# Patient Record
Sex: FEMALE | Race: WHITE | Employment: FULL TIME | URBAN - NONMETROPOLITAN AREA
[De-identification: names, ages, dates, MRNs, and addresses within clinical notes are randomized per-mention and may not be internally consistent; named-entity substitution may affect disease eponyms.]

---

## 2021-03-26 ENCOUNTER — TELEPHONE (OUTPATIENT)
Dept: SURGERY | Age: 61
End: 2021-03-26

## 2021-03-26 DIAGNOSIS — Z12.11 SCREEN FOR COLON CANCER: ICD-10-CM

## 2021-03-26 DIAGNOSIS — Z01.818 PRE-OP TESTING: Primary | ICD-10-CM

## 2021-03-26 NOTE — TELEPHONE ENCOUNTER
[facility] Colonoscopy Worksheet    Patient Name: Tisha Mcintosh  : 1960  Primary Care Physician: No primary care provider on file. Today's Date: 3/26/2021    Surgery Location:   [][place] [] [place] [x] [facility] [place] [] [facility]    Why has a Colonoscopy been recommended for you? Screening    To properly code your procedure we must ask the following questions. PLEASE CHECK ALL THAT APPLY. Are you currently having any of the following symptoms? NO    [] Rectal Bleeding (K62.5) [] Bloody Stool (K92.1) [] Dark Tarry Stool (K92.1)  [] Fecal Occult Positive Blood (confirmed by blood test R19.5)  [] Anemia (low hemoglobin D64.9) [] Abdominal Pain (R10.84) [] Diarrhea (R19.7)    **If you have any of these symptoms your colonoscopy is diagnostic and must be coded as such. It will not be coded as a screening colonoscopy. If you have no symptoms but have a personal history of polyps or a family history of colon cancer you fall in the high risk-screening category and we need to know more information. If you have had a polyp or polyps removed at your last colonoscopy then the first scope after that is considered diagnostic. Also if you have irritable bowel syndrome Chron's disease, diverticulitis or colon cancer then the scope would be a diagnostic colonoscopy. Have you ever had a colonoscopy? [] Yes  [x] No    If so, where and when was that done? Was anything found during the last scope? Was it removed? Do you have a family history of colon cancer? NO      Have you personally been diagnosed with colon cancer? NO    Any tobacco use? [] Yes    [x] No    Any alcohol use? [x] Yes    [] No  If yes, how often? Occassionally    In the last six (6) months have you experienced any of the following symptoms?  NO   [] Blood from the rectum or stool  [] Abdominal Pain   [] Diarrhea  [] Itching of rectum   [] Vomiting  [] Constipation   [] Black stools  [] Bloating

## 2021-03-27 RX ORDER — POLYETHYLENE GLYCOL 3350 17 G/17G
POWDER, FOR SOLUTION ORAL
Qty: 1 BOTTLE | Refills: 0 | Status: SHIPPED | OUTPATIENT
Start: 2021-03-27 | End: 2022-07-13

## 2021-04-22 LAB
SARS-COV-2, RAPID: NEGATIVE
SARS-COV-2, RAPID: NEGATIVE

## 2022-07-13 ENCOUNTER — OFFICE VISIT (OUTPATIENT)
Dept: SURGERY | Age: 62
End: 2022-07-13
Payer: COMMERCIAL

## 2022-07-13 VITALS
BODY MASS INDEX: 24.59 KG/M2 | RESPIRATION RATE: 16 BRPM | WEIGHT: 144 LBS | TEMPERATURE: 97.5 F | OXYGEN SATURATION: 98 % | SYSTOLIC BLOOD PRESSURE: 127 MMHG | HEART RATE: 63 BPM | DIASTOLIC BLOOD PRESSURE: 61 MMHG | HEIGHT: 64 IN

## 2022-07-13 DIAGNOSIS — K80.20 SYMPTOMATIC CHOLELITHIASIS: Primary | ICD-10-CM

## 2022-07-13 LAB
ANION GAP SERPL CALCULATED.3IONS-SCNC: 3.4 MMOL/L
BASOPHILS %: 1.27 (ref 0–3)
BASOPHILS ABSOLUTE: 0.1 (ref 0–0.3)
BUN BLDV-MCNC: 9 MG/DL (ref 7–17)
CALCIUM SERPL-MCNC: 9.2 MG/DL (ref 8.4–10.2)
CHLORIDE BLD-SCNC: 105 MMOL/L (ref 98–120)
CO2: 31 MMOL/L (ref 22–31)
CREAT SERPL-MCNC: 0.9 MG/DL (ref 0.5–1)
EOSINOPHILS %: 3.43 (ref 0–10)
EOSINOPHILS ABSOLUTE: 0.27 (ref 0–1.1)
GFR CALCULATED: > 60
GLUCOSE: 110 MG/DL (ref 65–105)
HCT VFR BLD CALC: 44.6 % (ref 37–47)
HEMOGLOBIN: 13.7 (ref 12–16)
LYMPHOCYTE %: 28.29 (ref 20–51.1)
LYMPHOCYTES ABSOLUTE: 2.23 (ref 1–5.5)
MCH RBC QN AUTO: 30.8 PG (ref 28.5–32.5)
MCHC RBC AUTO-ENTMCNC: 30.8 G/DL (ref 32–37)
MCV RBC AUTO: 100.1 FL (ref 80–94)
MONOCYTES %: 7.65 (ref 1.7–9.3)
MONOCYTES ABSOLUTE: 0.6 (ref 0.1–1)
NEUTROPHILS %: 59.36 (ref 42.2–75.2)
NEUTROPHILS ABSOLUTE: 4.68 (ref 2–8.1)
PDW BLD-RTO: 11.9 % (ref 8.5–15.5)
PLATELET # BLD: 246.2 THOU/MM3 (ref 130–400)
POTASSIUM SERPL-SCNC: 4 MMOL/L (ref 3.6–5)
RBC: 4.46 M/UL (ref 4.2–5.4)
SODIUM BLD-SCNC: 139 MMOL/L (ref 135–145)
WBC: 7.9 THOU/ML3 (ref 4.8–10.8)

## 2022-07-13 PROCEDURE — 99204 OFFICE O/P NEW MOD 45 MIN: CPT | Performed by: SURGERY

## 2022-07-13 NOTE — ASSESSMENT & PLAN NOTE
After evaluation and discussion of her recent symptoms I certainly think that it is consistent with symptomatic cholelithiasis. She is not having any ongoing significant symptoms that makes me think she has any acute inflammation or obstruction but certainly I think that she is having intermittent obstructions which is causing her symptoms. I will discuss to labs and imaging findings and the diagnosis of symptomatic cholelithiasis. We also discussed management options including surgical options. After discussion the patient would like to proceed with surgery. We will begin planning for laparoscopic cholecystectomy. Risks of the procedure including bleeding, infection, scarring, pain, damage surrounding structures including bile ducts, need for additional surgery, retained stone or bile leak, conversion to open and anesthesia risk are discussed and consent is obtained. Patient is recently had preoperative testing to include labs, EKG. No further testing should be necessary.

## 2022-07-13 NOTE — PROGRESS NOTES
Lindsay Rosa is a 63 y.o. female who presents today for further evaluation of ongoing episodes of right upper quadrant abdominal, flank and back pain. The patient is reporting about 2 weeks ago her initial episode occurred and she was awoken from sleep with fairly significant pain in the right side just below the costal margin. Her first episode she states was not bad enough to require medical attention and lasted for couple hours before resolving. However over the next several days she has had at least 2 additional episodes and these additional 2 episodes of certainly been more severe from her report. Again she was awoken from sleep by both of these episodes and she does report that prior to these really intense episode she had eaten at least on one occasion a fairly fatty meal.  Because of ongoing pain she initially went to the ER where she had a work-up that showed mild elevation of her white count and imaging did confirm gallstones with no signs of acute cholecystitis. During this time she denies any nausea or emesis. Has not had any changes in bowel movements. History reviewed. No pertinent past medical history. Past Surgical History:   Procedure Laterality Date    COLONOSCOPY  04/23/2021    Dr Dontrell Paredes, Diverticulosis, Repeat 10 years       No current outpatient medications on file. No current facility-administered medications for this visit. No Known Allergies    No family history on file.     Social History     Socioeconomic History    Marital status:      Spouse name: Not on file    Number of children: Not on file    Years of education: Not on file    Highest education level: Not on file   Occupational History    Not on file   Tobacco Use    Smoking status: Current Every Day Smoker     Packs/day: 0.25     Types: Cigarettes    Smokeless tobacco: Not on file   Substance and Sexual Activity    Alcohol use: Yes     Comment: socially    Drug use: Not on file  Sexual activity: Not on file   Other Topics Concern    Not on file   Social History Narrative    Not on file     Social Determinants of Health     Financial Resource Strain:     Difficulty of Paying Living Expenses: Not on file   Food Insecurity:     Worried About Running Out of Food in the Last Year: Not on file    Toyin of Food in the Last Year: Not on file   Transportation Needs:     Lack of Transportation (Medical): Not on file    Lack of Transportation (Non-Medical):  Not on file   Physical Activity:     Days of Exercise per Week: Not on file    Minutes of Exercise per Session: Not on file   Stress:     Feeling of Stress : Not on file   Social Connections:     Frequency of Communication with Friends and Family: Not on file    Frequency of Social Gatherings with Friends and Family: Not on file    Attends Orthodox Services: Not on file    Active Member of [address] [place] [address] [employer] or Organizations: Not on file    Attends Club or Organization Meetings: Not on file    Marital Status: Not on file   Intimate Partner Violence:     Fear of Current or Ex-Partner: Not on file    Emotionally Abused: Not on file    Physically Abused: Not on file    Sexually Abused: Not on file   Housing Stability:     Unable to Pay for Housing in the Last Year: Not on file    Number of [place] in the Last Year: Not on file    Unstable Housing in the Last Year: Not on file       ROS:   Review of Systems - General ROS: negative  Psychological ROS: negative  Ophthalmic ROS: negative  ENT ROS: negative  Respiratory ROS: no cough, shortness of breath, or wheezing  Cardiovascular ROS: no chest pain or dyspnea on exertion  Gastrointestinal ROS: per HPI  Genito-Urinary ROS: no dysuria, trouble voiding, or hematuria  Musculoskeletal ROS: negative  Dermatological ROS: negative      Objective   Vitals:    07/13/22 1021   BP: 127/61   Pulse: 63   Resp: 16   Temp: 97.5 °F (36.4 °C)   SpO2: 98%     General:in no apparent distress and well

## 2022-08-24 ENCOUNTER — OFFICE VISIT (OUTPATIENT)
Dept: SURGERY | Age: 62
End: 2022-08-24

## 2022-08-24 VITALS
SYSTOLIC BLOOD PRESSURE: 100 MMHG | HEIGHT: 64 IN | TEMPERATURE: 98.2 F | BODY MASS INDEX: 24.59 KG/M2 | WEIGHT: 144 LBS | HEART RATE: 72 BPM | RESPIRATION RATE: 16 BRPM | OXYGEN SATURATION: 98 % | DIASTOLIC BLOOD PRESSURE: 60 MMHG

## 2022-08-24 DIAGNOSIS — Z09 POSTOP CHECK: Primary | ICD-10-CM

## 2022-08-24 PROCEDURE — 99024 POSTOP FOLLOW-UP VISIT: CPT | Performed by: SURGERY

## 2022-08-24 NOTE — PROGRESS NOTES
Ira Danielle is a 63 y.o. female who presents today for follow-up after recent laparoscopic cholecystectomy approximately 2 weeks ago. Patient is reporting that since surgery she has been doing well. Did take about 2 days worth of prescribed pain medication but since that time has not required any sort of pain control. I reports that currently she gets some very mild twinges of pain with certain activities but at baseline does not have any pain. She does report that she is tolerating diet without any issues. Has not found any foods to cause her any diarrhea and has been trialing higher fat foods. Is having regular bowel and bladder function. Denies any incisional problems. Patient does report that around the area of her epigastric incision that when she eats she has some \"fluttering\" that is very brief and usually only lasts a second or 2. No other complaints. No past medical history on file. Past Surgical History:   Procedure Laterality Date    CHOLECYSTECTOMY      COLONOSCOPY  04/23/2021    Dr Lars Gonzales, Diverticulosis, Repeat 10 years       No current outpatient medications on file. No current facility-administered medications for this visit. No Known Allergies    No family history on file.     Social History     Socioeconomic History    Marital status:      Spouse name: Not on file    Number of children: Not on file    Years of education: Not on file    Highest education level: Not on file   Occupational History    Not on file   Tobacco Use    Smoking status: Every Day     Packs/day: 0.25     Types: Cigarettes    Smokeless tobacco: Not on file   Substance and Sexual Activity    Alcohol use: Yes     Comment: socially    Drug use: Not on file    Sexual activity: Not on file   Other Topics Concern    Not on file   Social History Narrative    Not on file     Social Determinants of Health     Financial Resource Strain: Not on file   Food Insecurity: Not on file Transportation Needs: Not on file   Physical Activity: Not on file   Stress: Not on file   Social Connections: Not on file   Intimate Partner Violence: Not on file   Housing Stability: Not on file       ROS:   Review of Systems - Negative except As noted in HPI      Objective   Vitals:    08/24/22 1049   BP: 100/60   Pulse: 72   Resp: 16   Temp: 98.2 °F (36.8 °C)   SpO2: 98%     General:in no apparent distress and well developed and well nourished  Eyes: No gross abnormalities. Ears, Nose, Throat: hearing grossly normal bilaterally  Neck: neck supple and non tender without mass  Lungs: clear to auscultation without wheezes or rales   Heart: S1S2, no mumurs, RRR  Abdomen: Soft, nondistended, minimal tenderness palpation at incisions but otherwise nontender, bowel sounds present. All incisions intact and healing well. Skin glue still in place. Extremity: negative  Neuro: CN II-XII grossly intact      1. Postop check  Assessment & Plan:  Postoperatively patient seems to be doing well overall and is healing as expected. I have discussed the pathology with the patient and she voiced understanding. Continue activity restrictions for full 4 weeks after surgery and then may begin to return to activity as tolerated. Okay to start submerging in water. Okay to drive. May return to work as tolerated with activity restrictions. Patient to follow-up as needed. She is encouraged to call with any questions concerns or problems.          (Please note that portions of this note were completed with a voice recognition program.  Efforts were made to edit the dictations but occasionally words are mis-transcribed.)

## 2022-09-23 PROBLEM — Z09 POSTOP CHECK: Status: RESOLVED | Noted: 2022-08-24 | Resolved: 2022-09-23
